# Patient Record
Sex: MALE | Race: WHITE
[De-identification: names, ages, dates, MRNs, and addresses within clinical notes are randomized per-mention and may not be internally consistent; named-entity substitution may affect disease eponyms.]

---

## 2018-07-25 ENCOUNTER — HOSPITAL ENCOUNTER (OUTPATIENT)
Dept: HOSPITAL 59 - SUR | Age: 58
Discharge: HOME | End: 2018-07-25
Attending: PAIN MEDICINE
Payer: MEDICARE

## 2018-07-25 DIAGNOSIS — M96.1: Primary | ICD-10-CM

## 2018-07-25 DIAGNOSIS — M54.12: ICD-10-CM

## 2018-07-25 PROCEDURE — 85002 BLEEDING TIME TEST: CPT

## 2018-07-25 NOTE — HISTORY AND PHYSICAL REPORT
DATE:  07/24/2018.



CHIEF COMPLAINT AND HISTORY OF CHIEF COMPLAINT:  This patient presents with a 
history of an intractable postlumbar laminectomy radiculopathy.  A spinal cord 
implant was performed on 02/12/2016.  Although for a period of time it appeared 
to be working well, recently within the last six to eight months, the system 
has started malfunctioning.  It was believed that we had battery depletion, but 
instead of recharging and securing help in programming the system, he opted to 
have it removed.



PAST MEDICAL HISTORY:  Headaches, degenerative arthritis, bladder dysfunction.



PAST SURGICAL HISTORY:  Cervical spine fusion, lumbar spine fusion, ulnar 
transposition, nasal surgery.



MEDICATIONS ON ADMISSION:  To be provided.



ALLERGIES:  To be provided.



SOCIAL HISTORY:  Noncontributory.



FAMILY HISTORY:  Thyroid disease, diabetes, hypertension.



REVIEW OF SYSTEMS:  The patient is appropriate and in no acute distress.  The 
remainder of the systems review shows difficulty sleeping.



PHYSICAL EXAMINATION:  

General:  Height and weight unavailable.

Vital Signs:  Not available.

HEENT:  Within normal limits.

Lungs:  Clear.

Heart:  Regular rate and rhythm.

Abdomen:  Nontender.

Musculoskeletal:  Examination of the musculoskeletal system shows the incision 
for the leads approximating the midthoracic spine.  A generator pouch at the 
left posterior flank is identified.  All of the incisions are intact.  His 
primary pain pattern is in the neck, shoulder, and arm.  Motor and sensory 
field abnormalities are noted to the right following a 5-6, 6-7 pattern.

Neurologic:  Cranial nerves are intact.



IMPRESSION:  

1.  POSTCERVICAL LAMINECTOMY SYNDROME, ICD-10 CODE M96.1.

2.  RADICULOPATHY, ICD-10 CODE M54.12.

3.  SPINAL CORD STIMULATOR IMPLANT WITH GENERATOR.



PLAN:  The patient is here by his request for removal of two spinal cord 
stimulators and internal generator.  The procedure will be considered outpatient
, although an overnight stay will be evaluated.



JOB NUMBER:  [id number]



cc:  Fareed Rey M.D.
JORI

## 2018-07-26 NOTE — OPERATIVE NOTE
DATE:  07/25/2018.



PREOPERATIVE DIAGNOSES:

1.  POSTCERVICAL LAMINECTOMY SYNDROME, ICD-10 CODE M96.1.

2.  CERVICAL RADICULOPATHY, ICD-10 CODE M54.12.

3.  SPINAL CORD STIMULATOR, TWO LEADS, INTERNAL GENERATOR NONFUNCTIONAL.



PROCEDURES:

1.  FLUOROSCOPICALLY GUIDED INCISION, SUBCUTANEOUS DISSECTION, AND REMOVAL OF 
TWO CERVICAL SPINAL CORD STIMULATORS.

2.  INCISION, SUBCUTANEOUS DISSECTION, AND REMOVAL OF INTERNAL PULSE GENERATOR, 
LEFT FLANK, WITH CONNECTORS.



SURGEON:  Janes Angel D.O.



ANESTHESIA:  Local sedation.



ANESTHESIA PROVIDER:  Tyler Mcdonald CRNA.



INDICATION:  This patient presents with a history of an intractable 
postcervical laminectomy radiculopathy.  Due to the failure of all therapies a 
stimulator trial was conducted followed by implant.  The system has been in for 
over two years.  Over the last number of months, the system has malfunctioned.  
Instead of revision which was suggested, he opted to remove.  He is here for 
removal.



DESCRIPTION OF THE PROCEDURE:  Intravenous line, vital sign monitoring, 
intravenous sedation by Anesthesia.  The patient was positioned prone.  Sterile 
prep and sterile technique.  The incisional site for the two leads 
approximating 12-1 was infiltrated with local.  An incision was made and 
subcutaneous dissection was conducted.  The leads, the anchors, and the sutures 
were removed intact.  At the left flank at the generator pouch, the skin was 
infiltrated.  An incision was made and subcutaneous dissection was conducted to 
the pouch.  The generator, its connections, and the leads were removed intact.  
Bovie for hemostasis.  Vancomycin powder was placed into both wounds.  The 
incisions were then closed with Vicryl for the fascia and staples for the skin.
  A dressing was placed.  



He was transported to the recovery room stable, showing no side effects from 
the procedure or the sedation.



DISCHARGE INSTRUCTIONS:  

1.  The sites are to remain clean and dry because of his staples.  These will 
need to be removed in 10 to 14 days.  No showering or bathing until he is seen 
in the office.  The office is to contact the patient to set up the evaluation 
in the next 24 to 48 hours.

2.  Standard medications are to be resumed including Levaquin the antibiotic 
500 mg once a day for 14 days.

3.  He will resume all other medications.  

4.  All other instructions were provided and numbers to contact with problems 
were given.



JOB NUMBER:  [id number]



cc:  TrevonHAZEL Valentin